# Patient Record
Sex: FEMALE | URBAN - METROPOLITAN AREA
[De-identification: names, ages, dates, MRNs, and addresses within clinical notes are randomized per-mention and may not be internally consistent; named-entity substitution may affect disease eponyms.]

---

## 2024-02-28 ENCOUNTER — OV NP (OUTPATIENT)
Dept: URBAN - METROPOLITAN AREA CLINIC 82 | Facility: CLINIC | Age: 39
End: 2024-02-28

## 2024-02-28 VITALS
BODY MASS INDEX: 30.73 KG/M2 | HEIGHT: 64 IN | HEART RATE: 81 BPM | WEIGHT: 180 LBS | TEMPERATURE: 97.2 F | SYSTOLIC BLOOD PRESSURE: 103 MMHG | DIASTOLIC BLOOD PRESSURE: 69 MMHG

## 2024-02-28 NOTE — HPI-TODAY'S VISIT:
38 y.o Afghan-speaking female presents today for c/o flares hemorrhoids x few days. Reports rectal discomfort, bleeding, worse w movements. Patient uses cream, minimal relief. She had internal hemorrhoids band before in the past w Dr. Mann HIGUERA. Resolved for few months, sx has now returned. BM daily, denies any constipation or straining. Patient is here for hemorrhoidectomy option.

## 2024-02-29 PROBLEM — 23913003: Status: ACTIVE | Noted: 2024-02-29

## 2024-02-29 PROBLEM — 90458007: Status: ACTIVE | Noted: 2024-02-29
